# Patient Record
Sex: FEMALE | Race: WHITE | HISPANIC OR LATINO
[De-identification: names, ages, dates, MRNs, and addresses within clinical notes are randomized per-mention and may not be internally consistent; named-entity substitution may affect disease eponyms.]

---

## 2018-04-17 ENCOUNTER — APPOINTMENT (OUTPATIENT)
Dept: FAMILY MEDICINE | Facility: CLINIC | Age: 38
End: 2018-04-17
Payer: MEDICAID

## 2018-04-17 VITALS — DIASTOLIC BLOOD PRESSURE: 68 MMHG | SYSTOLIC BLOOD PRESSURE: 130 MMHG

## 2018-04-17 PROCEDURE — 99202 OFFICE O/P NEW SF 15 MIN: CPT

## 2018-11-26 ENCOUNTER — APPOINTMENT (OUTPATIENT)
Dept: FAMILY MEDICINE | Facility: CLINIC | Age: 38
End: 2018-11-26

## 2018-11-29 ENCOUNTER — APPOINTMENT (OUTPATIENT)
Dept: FAMILY MEDICINE | Facility: CLINIC | Age: 38
End: 2018-11-29

## 2018-11-29 ENCOUNTER — APPOINTMENT (OUTPATIENT)
Dept: FAMILY MEDICINE | Facility: CLINIC | Age: 38
End: 2018-11-29
Payer: MEDICARE

## 2018-11-29 PROCEDURE — 90670 PCV13 VACCINE IM: CPT

## 2018-11-29 PROCEDURE — G0009: CPT

## 2021-11-10 ENCOUNTER — APPOINTMENT (OUTPATIENT)
Dept: FAMILY MEDICINE | Facility: CLINIC | Age: 41
End: 2021-11-10
Payer: MEDICARE

## 2021-11-10 VITALS
TEMPERATURE: 98 F | RESPIRATION RATE: 16 BRPM | SYSTOLIC BLOOD PRESSURE: 132 MMHG | HEART RATE: 63 BPM | DIASTOLIC BLOOD PRESSURE: 66 MMHG | OXYGEN SATURATION: 93 %

## 2021-11-10 DIAGNOSIS — J45.909 UNSPECIFIED ASTHMA, UNCOMPLICATED: ICD-10-CM

## 2021-11-10 DIAGNOSIS — Z23 ENCOUNTER FOR IMMUNIZATION: ICD-10-CM

## 2021-11-10 DIAGNOSIS — G80.8 OTHER CEREBRAL PALSY: ICD-10-CM

## 2021-11-10 DIAGNOSIS — G40.909 EPILEPSY, UNSPECIFIED, NOT INTRACTABLE, W/OUT STATUS EPILEPTICUS: ICD-10-CM

## 2021-11-10 DIAGNOSIS — L98.9 DISORDER OF THE SKIN AND SUBCUTANEOUS TISSUE, UNSPECIFIED: ICD-10-CM

## 2021-11-10 PROCEDURE — G0438: CPT

## 2021-11-12 PROBLEM — J45.909 MILD REACTIVE AIRWAYS DISEASE: Status: ACTIVE | Noted: 2021-11-12

## 2021-11-12 PROBLEM — Z23 ENCOUNTER FOR IMMUNIZATION: Status: RESOLVED | Noted: 2020-11-20 | Resolved: 2021-11-12

## 2021-11-12 PROBLEM — G80.8 CEREBRAL PALSY, QUADRIPLEGIC: Status: RESOLVED | Noted: 2021-11-12 | Resolved: 2021-11-12

## 2021-11-12 RX ORDER — PHENOBARBITAL 97.2 MG/1
TABLET ORAL
Refills: 0 | Status: ACTIVE | COMMUNITY

## 2021-11-12 RX ORDER — VALPROATE SODIUM 100 MG/ML
INJECTION, SOLUTION INTRAVENOUS
Refills: 0 | Status: ACTIVE | COMMUNITY

## 2021-11-12 NOTE — HEALTH RISK ASSESSMENT
[Very Good] : ~his/her~  mood as very good [0] : 2) Feeling down, depressed, or hopeless: Not at all (0) [PHQ-2 Negative - No further assessment needed] : PHQ-2 Negative - No further assessment needed [Language] : difficulty with language [Behavior] : difficulty with behavior [Learning/Retaining New Information] : difficulty learning/retaining new information [Handling Complex Tasks] : difficulty handling complex tasks [Reasoning] : difficulty with reasoning [Spatial Ability and Orientation] : difficulty with spatial ability and orientation [Transportation] : transportation [High School] : high school [Full assistance needed] : managing finances [No] : In the past 12 months have you used drugs other than those required for medical reasons? No [No falls in past year] : Patient reported no falls in the past year [With Family] : lives with family [Feels Safe at Home] : Feels safe at home [FreeTextEntry1] : none [] : No [de-identified] : pulm, neuro [de-identified] : wheelchair bound [de-identified] : balanced [[id number]] : 0 [Change in mental status noted] : No change in mental status noted [Sexually Active] : not sexually active [High Risk Behavior] : no high risk behavior [Reports changes in hearing] : Reports no changes in hearing [Reports changes in vision] : Reports no changes in vision [Reports changes in dental health] : Reports no changes in dental health [MammogramComments] : mother would like to think about it due inability for patient to tand [de-identified] : will refer when hungry and in trey or need use bathroom or does not like  [PapSmearComments] : mother defers; patient never been sexually active [de-identified] : DANIAL- tho Ling, mom

## 2021-11-12 NOTE — PHYSICAL EXAM
[Normal] : no posterior cervical lymphadenopathy and no anterior cervical lymphadenopathy [de-identified] : in wheelchair [de-identified] : ALEXI lyle

## 2021-11-12 NOTE — HISTORY OF PRESENT ILLNESS
[FreeTextEntry1] : CPE [de-identified] : 40 yo f, with pmhx of Severe Cerebral Palsy, aphasia, and epilepsy, here for CPE\par accompanied by mother Simona, and HHA of 22 years (former sister in law) Arelis Ling\par Patient is in a wheelchair, and cannot walk or speak since birth. \par No acute medical problems at this present time. \par seeing neuro and pulm\par hx of reactive airway disease- on albuterol nebulizer prn\par denies any other complaints or concerns at this time

## 2021-11-12 NOTE — ASSESSMENT
[FreeTextEntry1] : Routine medical examination\par VSS- exam normal \par c/w current medications\par follow up with neuro and pulm \par will schedule for home labs \par Advised healthy diet  \par mother defers mammogram referral at this time as well as gyn screening- would like to think about it further\par will follow up labs \par caregivers verbalize understanding and patient is stable upon discharge\par

## 2021-12-07 ENCOUNTER — LABORATORY RESULT (OUTPATIENT)
Age: 41
End: 2021-12-07

## 2022-05-11 ENCOUNTER — APPOINTMENT (OUTPATIENT)
Dept: FAMILY MEDICINE | Facility: CLINIC | Age: 42
End: 2022-05-11
Payer: MEDICARE

## 2022-05-11 VITALS
TEMPERATURE: 97.8 F | HEART RATE: 104 BPM | RESPIRATION RATE: 16 BRPM | SYSTOLIC BLOOD PRESSURE: 128 MMHG | OXYGEN SATURATION: 87 % | DIASTOLIC BLOOD PRESSURE: 86 MMHG

## 2022-05-11 VITALS — OXYGEN SATURATION: 90 %

## 2022-05-11 DIAGNOSIS — G80.9 CEREBRAL PALSY, UNSPECIFIED: Chronic | ICD-10-CM

## 2022-05-11 DIAGNOSIS — Z99.3 DEPENDENCE ON WHEELCHAIR: ICD-10-CM

## 2022-05-11 DIAGNOSIS — G40.909 EPILEPSY, UNSPECIFIED, NOT INTRACTABLE, W/OUT STATUS EPILEPTICUS: ICD-10-CM

## 2022-05-11 DIAGNOSIS — R47.01 APHASIA: ICD-10-CM

## 2022-05-11 DIAGNOSIS — J69.0 PNEUMONITIS DUE TO INHALATION OF FOOD AND VOMIT: ICD-10-CM

## 2022-05-11 DIAGNOSIS — Z93.1 GASTROSTOMY STATUS: ICD-10-CM

## 2022-05-11 PROCEDURE — 99214 OFFICE O/P EST MOD 30 MIN: CPT

## 2022-05-18 PROBLEM — G80.9: Chronic | Status: ACTIVE | Noted: 2021-11-12

## 2022-05-18 PROBLEM — R47.01 APHASIA: Status: ACTIVE | Noted: 2021-11-12

## 2022-05-18 PROBLEM — G40.909 EPILEPSY: Status: ACTIVE | Noted: 2018-04-17

## 2022-05-18 PROBLEM — J69.0 ASPIRATION PNEUMONIA OF RIGHT LOWER LOBE DUE TO REGURGITATED FOOD: Status: ACTIVE | Noted: 2022-05-11

## 2022-05-18 PROBLEM — Z99.3 WHEELCHAIR BOUND: Status: ACTIVE | Noted: 2021-11-12

## 2022-05-18 PROBLEM — Z93.1 S/P PERCUTANEOUS ENDOSCOPIC GASTROSTOMY (PEG) TUBE PLACEMENT: Status: ACTIVE | Noted: 2022-05-11

## 2022-05-18 RX ORDER — GUAIFENESIN 600 MG/1
TABLET, EXTENDED RELEASE ORAL
Refills: 0 | Status: ACTIVE | COMMUNITY

## 2022-05-18 NOTE — ASSESSMENT
[FreeTextEntry1] : VSS\par c/w follow up with neuro, GI, and pulm as scheduled\par will follow up regarding repeat CXR and long term goals with PEG\par follow up in office if sx persists or worsens\par parent and caregiver verbalizes understanding and patient is stable upon d/c\par

## 2022-05-18 NOTE — HISTORY OF PRESENT ILLNESS
[Parent] : parent [Formal Caregiver] : formal caregiver [FreeTextEntry8] : 41 yo with cerebral palsy, seizure disorder,  wheelchair bound, non verbal on home oxygen of 6 L at night admitted with acute on chronic hypoxic respiratory failure from sepsis due to aspiration pneumonia- admitted to [facility] on 4/5/2022 and discharged on 4/29/2022. hospital course was complicated by hypoglycemia, fluid overload and dysphagia requiring peg placement due to acute moderate protein calorie malnutrition. \par finished course of IV Cefepime on 4/20/2022\par was on high flow oxyen in hospital since 4/26/2022 and then transitioned to low flow o2 NC of 5- 6 L\par peg was placed on 4/27/2022- on jevity, medications through peg as well  \par current medications: \par guafenisin 10 ml via peg q 6 h\par mag ox 400 mg via g tube x 7 days \par mvn for 30 days \par valproic acid 25 ml via peg twice daily \par albuterol 2.5/3 ml via neb \par phenobarbital 30 mg via g tube 2 x a day \par overall is feeling well \par has remained active and alert at home \par coughing has improved \par scheduled to see GI today to follow up peg - Dr. Ayden Ames \par scheduled to see pulm on 5/24/2022 (Dr. Constantine Kahn) \par denies any fever or chills, sob \par denies any other associated symptoms\par denies any other complaints or concerns at this time

## 2022-05-18 NOTE — PHYSICAL EXAM
[Normal] : no posterior cervical lymphadenopathy and no anterior cervical lymphadenopathy [de-identified] : in wheelchair [de-identified] : RLL lower lobe crackles  [de-identified] : hypertonic UE and LE

## 2022-05-18 NOTE — HISTORY OF PRESENT ILLNESS
[Parent] : parent [Formal Caregiver] : formal caregiver [FreeTextEntry8] : 41 yo with cerebral palsy, seizure disorder,  wheelchair bound, non verbal on home oxygen of 6 L at night admitted with acute on chronic hypoxic respiratory failure from sepsis due to aspiration pneumonia- admitted to [facility] on 4/5/2022 and discharged on 4/29/2022. hospital course was complicated by hypoglycemia, fluid overload and dysphagia requiring peg placement due to acute moderate protein calorie malnutrition. \par finished course of IV Cefepime on 4/20/2022\par was on high flow oxyen in hospital since 4/26/2022 and then transitioned to low flow o2 NC of 5- 6 L\par peg was placed on 4/27/2022- on jevity, medications through peg as well  \par current medications: \par guafenisin 10 ml via peg q 6 h\par mag ox 400 mg via g tube x 7 days \par mvn for 30 days \par valproic acid 25 ml via peg twice daily \par albuterol 2.5/3 ml via neb \par phenobarbital 30 mg via g tube 2 x a day \par overall is feeling well \par has remained active and alert at home \par coughing has improved \par scheduled to see GI today to follow up peg - Dr. Ayden Ames \par scheduled to see pulm on 5/24/2022 (Dr. Constantine Kahn) \par denies any fever or chills, sob \par denies any other associated symptoms\par denies any other complaints or concerns at this time

## 2022-05-18 NOTE — PHYSICAL EXAM
[Normal] : no posterior cervical lymphadenopathy and no anterior cervical lymphadenopathy [de-identified] : in wheelchair [de-identified] : RLL lower lobe crackles  [de-identified] : hypertonic UE and LE

## 2022-06-02 DIAGNOSIS — E46 UNSPECIFIED PROTEIN-CALORIE MALNUTRITION: ICD-10-CM

## 2022-06-02 RX ORDER — ALBUTEROL SULFATE 2.5 MG/3ML
(2.5 MG/3ML) SOLUTION RESPIRATORY (INHALATION)
Qty: 3 | Refills: 3 | Status: ACTIVE | COMMUNITY
Start: 2021-11-12

## 2022-06-02 RX ORDER — MULTIVIT-MIN/FERROUS GLUCONATE 9 MG/15 ML
LIQUID (ML) ORAL
Qty: 1 | Refills: 5 | Status: ACTIVE | COMMUNITY
Start: 2022-06-02 | End: 1900-01-01

## 2022-06-17 DIAGNOSIS — K59.09 OTHER CONSTIPATION: ICD-10-CM

## 2022-06-17 RX ORDER — POLYETHYLENE GLYCOL 3350 17 G/17G
17 POWDER, FOR SOLUTION ORAL DAILY
Qty: 1 | Refills: 1 | Status: ACTIVE | COMMUNITY
Start: 2022-06-17 | End: 1900-01-01

## 2023-10-10 DIAGNOSIS — Z00.00 ENCOUNTER FOR GENERAL ADULT MEDICAL EXAMINATION W/OUT ABNORMAL FINDINGS: ICD-10-CM

## 2023-10-11 ENCOUNTER — NON-APPOINTMENT (OUTPATIENT)
Age: 43
End: 2023-10-11

## 2023-10-24 ENCOUNTER — APPOINTMENT (OUTPATIENT)
Dept: FAMILY MEDICINE | Facility: CLINIC | Age: 43
End: 2023-10-24